# Patient Record
Sex: FEMALE | Race: WHITE
[De-identification: names, ages, dates, MRNs, and addresses within clinical notes are randomized per-mention and may not be internally consistent; named-entity substitution may affect disease eponyms.]

---

## 2020-01-07 ENCOUNTER — HOSPITAL ENCOUNTER (OUTPATIENT)
Dept: HOSPITAL 19 - MC.RAD | Age: 65
End: 2020-01-07
Attending: FAMILY MEDICINE
Payer: COMMERCIAL

## 2020-01-07 DIAGNOSIS — Z00.00: Primary | ICD-10-CM

## 2020-01-07 DIAGNOSIS — Z12.31: ICD-10-CM

## 2020-06-19 ENCOUNTER — HOSPITAL ENCOUNTER (INPATIENT)
Dept: HOSPITAL 19 - COL.ER | Age: 65
LOS: 3 days | Discharge: NURSING, REHAB OR LONG-TERM CARE | DRG: 482 | End: 2020-06-22
Attending: INTERNAL MEDICINE | Admitting: INTERNAL MEDICINE
Payer: COMMERCIAL

## 2020-06-19 VITALS — SYSTOLIC BLOOD PRESSURE: 135 MMHG | HEART RATE: 82 BPM | DIASTOLIC BLOOD PRESSURE: 70 MMHG

## 2020-06-19 VITALS — TEMPERATURE: 97.8 F | DIASTOLIC BLOOD PRESSURE: 72 MMHG | SYSTOLIC BLOOD PRESSURE: 143 MMHG | HEART RATE: 74 BPM

## 2020-06-19 VITALS — SYSTOLIC BLOOD PRESSURE: 153 MMHG | DIASTOLIC BLOOD PRESSURE: 84 MMHG | TEMPERATURE: 98.6 F | HEART RATE: 70 BPM

## 2020-06-19 VITALS — DIASTOLIC BLOOD PRESSURE: 72 MMHG | TEMPERATURE: 97.8 F | HEART RATE: 74 BPM | SYSTOLIC BLOOD PRESSURE: 143 MMHG

## 2020-06-19 VITALS — SYSTOLIC BLOOD PRESSURE: 135 MMHG | DIASTOLIC BLOOD PRESSURE: 75 MMHG | HEART RATE: 78 BPM

## 2020-06-19 VITALS — DIASTOLIC BLOOD PRESSURE: 73 MMHG | SYSTOLIC BLOOD PRESSURE: 128 MMHG | HEART RATE: 67 BPM

## 2020-06-19 VITALS — SYSTOLIC BLOOD PRESSURE: 131 MMHG | HEART RATE: 65 BPM | DIASTOLIC BLOOD PRESSURE: 66 MMHG

## 2020-06-19 VITALS — SYSTOLIC BLOOD PRESSURE: 134 MMHG | DIASTOLIC BLOOD PRESSURE: 64 MMHG | HEART RATE: 63 BPM

## 2020-06-19 VITALS — DIASTOLIC BLOOD PRESSURE: 66 MMHG | SYSTOLIC BLOOD PRESSURE: 130 MMHG | HEART RATE: 68 BPM

## 2020-06-19 VITALS — HEIGHT: 65.98 IN | BODY MASS INDEX: 36 KG/M2 | WEIGHT: 223.99 LBS

## 2020-06-19 VITALS — HEART RATE: 65 BPM | DIASTOLIC BLOOD PRESSURE: 67 MMHG | SYSTOLIC BLOOD PRESSURE: 132 MMHG

## 2020-06-19 VITALS — HEART RATE: 75 BPM | DIASTOLIC BLOOD PRESSURE: 68 MMHG | SYSTOLIC BLOOD PRESSURE: 130 MMHG

## 2020-06-19 DIAGNOSIS — K59.03: ICD-10-CM

## 2020-06-19 DIAGNOSIS — T40.2X5A: ICD-10-CM
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
## 2020-06-19 DIAGNOSIS — M16.12: ICD-10-CM

## 2020-06-19 DIAGNOSIS — Z88.5: ICD-10-CM

## 2020-06-19 DIAGNOSIS — D72.829: ICD-10-CM

## 2020-06-19 DIAGNOSIS — R03.0: ICD-10-CM

## 2020-06-19 DIAGNOSIS — S72.142A: Primary | ICD-10-CM

## 2020-06-19 DIAGNOSIS — K21.9: ICD-10-CM

## 2020-06-19 LAB
ANION GAP SERPL CALC-SCNC: 5 MMOL/L (ref 7–16)
BASOPHILS # BLD: 0 10*3/UL (ref 0–0.2)
BASOPHILS NFR BLD AUTO: 0.2 % (ref 0–2)
BUN SERPL-MCNC: 19 MG/DL (ref 7–17)
CALCIUM SERPL-MCNC: 9.5 MG/DL (ref 8.4–10.2)
CHLORIDE SERPL-SCNC: 107 MMOL/L (ref 98–107)
CO2 SERPL-SCNC: 25 MMOL/L (ref 22–30)
CREAT SERPL-SCNC: 0.71 UMOL/L (ref 0.52–1.25)
EOSINOPHIL # BLD: 0 10*3/UL (ref 0–0.7)
EOSINOPHIL NFR BLD: 0.2 % (ref 0–4)
ERYTHROCYTE [DISTWIDTH] IN BLOOD BY AUTOMATED COUNT: 13.8 % (ref 11.5–14.5)
GLUCOSE SERPL-MCNC: 116 MG/DL (ref 74–106)
GRANULOCYTES # BLD AUTO: 86.2 % (ref 42.2–75.2)
HCT VFR BLD AUTO: 41 % (ref 37–47)
HGB BLD-MCNC: 13.4 G/DL (ref 12.5–16)
INR BLD: 1.1 (ref 0.8–3)
LYMPHOCYTES # BLD: 1.3 10*3/UL (ref 1.2–3.4)
LYMPHOCYTES NFR BLD: 8.2 % (ref 20–51)
MCH RBC QN AUTO: 30 PG (ref 27–31)
MCHC RBC AUTO-ENTMCNC: 33 G/DL (ref 33–37)
MCV RBC AUTO: 91 FL (ref 80–100)
MONOCYTES # BLD: 0.8 10*3/UL (ref 0.1–0.6)
MONOCYTES NFR BLD AUTO: 4.8 % (ref 1.7–9.3)
NEUTROPHILS # BLD: 14.1 10*3/UL (ref 1.4–6.5)
PH UR STRIP.AUTO: 6 [PH] (ref 5–8)
PLATELET # BLD AUTO: 282 K/MM3 (ref 130–400)
PMV BLD AUTO: 10.6 FL (ref 7.4–10.4)
POTASSIUM SERPL-SCNC: 4.1 MMOL/L (ref 3.4–5)
PROTHROMBIN TIME: 12.3 SECONDS (ref 9.7–12.8)
RBC # BLD AUTO: 4.51 M/MM3 (ref 4.1–5.3)
RBC # UR: (no result) /HPF
SODIUM SERPL-SCNC: 138 MMOL/L (ref 137–145)
SP GR UR STRIP.AUTO: 1 (ref 1–1.03)
URN COLLECT METHOD CLASS: (no result)

## 2020-06-19 PROCEDURE — C1713 ANCHOR/SCREW BN/BN,TIS/BN: HCPCS

## 2020-06-19 PROCEDURE — A4314 CATH W/DRAINAGE 2-WAY LATEX: HCPCS

## 2020-06-19 PROCEDURE — A9284 NON-ELECTRONIC SPIROMETER: HCPCS

## 2020-06-19 PROCEDURE — 0QS734Z REPOSITION LEFT UPPER FEMUR WITH INTERNAL FIXATION DEVICE, PERCUTANEOUS APPROACH: ICD-10-PCS | Performed by: ORTHOPAEDIC SURGERY

## 2020-06-19 NOTE — NUR
Patient has done well postop. Vitals stable on room air. Left hip dressing
clean dry & intact. gauze & tegaderm. Ivf per orders. Teds & scds ble. SHe
tolerated dinner. Bedside report to Kayleen Osorio

## 2020-06-19 NOTE — NUR
Patient admitted to room 326 from the ER. Alert & oriented. 5 page, med rec &
inital completed. I have spoke to both Sheridan PATTON & Anel PATTON, orders obtained.
UA completed, EKG completed. Dilaudid for pain amagement. Scds ble & neha to
RLE. Anticiapting surgery this afternoon. SHe has spoken with her family.

## 2020-06-19 NOTE — NUR
Patient to the OR with Olegario higgins. Cna assisted patient with hygiene
prior to OR. Iv dilaudid releived her pain. Patient used bedpan prior to the
OR, did not want pacheco at this time.

## 2020-06-19 NOTE — NUR
Pt was in quite a bit of pain. Pt is able to move her legs now. Pt is
currently resting in bed. Ice was applied to her left hip. Her left leg is
elevated on a pillow. Pt has her call light within reach and her bed is in
lowest position.

## 2020-06-19 NOTE — NUR
Received report from SHIMON Yip. Pt currently lying in bed. Pt did state that
she was having pain. Pt was given 1 Norco tab. When reassessment was done pt
stated that her pain had increased from a 7 up to a 8. Pt was given 1 more tab
at this time. Pt also was able to take her night medications with no problems.
Pt has her call light within reach and her bed is in lowest position.

## 2020-06-19 NOTE — NUR
met with patient to discuss discharge planning. Patient lives
alone in [place] and is employed at [employer] in the [employer]. Patient sees ABDI Love for primary care and obtains
medications from [facility] with no difficulties. Patient does not use any
DME and reports she is normally independent with ADLS. Patient does state she
struggles with hip pain. Patient reports she fell outside this morning and
came to ED via EMS. Patient to have surgery this afternoon and is not sure
what her discharge plan is at this time. Patient states she does have family
that can come stay with her if needed. Patient has three children. Patient's
son Eusebio (ph#[phone]) lives in [place], her son Abran (ph#[phone])
lives in [place], and her daughter Ashanti (ph#[phone]) lives in
[place]. Patient does not have Advance Directives but wanted to complete
DPOA-HC document. SW assisted patient in filling out form. Patient wanted to
designate her sons, Eusebio and Abran. Patient verbalized understanding of what
she is signing. SANTHOSH and ASHLEY Wynne provided witness signature. SW provided
original and copies to patient then placed copy in patient's chart. SW to
continue to monitor for discharge needs.

## 2020-06-20 VITALS — TEMPERATURE: 98.3 F | HEART RATE: 74 BPM | DIASTOLIC BLOOD PRESSURE: 57 MMHG | SYSTOLIC BLOOD PRESSURE: 117 MMHG

## 2020-06-20 VITALS — SYSTOLIC BLOOD PRESSURE: 149 MMHG | HEART RATE: 77 BPM | DIASTOLIC BLOOD PRESSURE: 74 MMHG | TEMPERATURE: 98.3 F

## 2020-06-20 VITALS — SYSTOLIC BLOOD PRESSURE: 132 MMHG | DIASTOLIC BLOOD PRESSURE: 82 MMHG | TEMPERATURE: 97.3 F | HEART RATE: 67 BPM

## 2020-06-20 VITALS — DIASTOLIC BLOOD PRESSURE: 58 MMHG | HEART RATE: 77 BPM | SYSTOLIC BLOOD PRESSURE: 142 MMHG | TEMPERATURE: 97.9 F

## 2020-06-20 VITALS — DIASTOLIC BLOOD PRESSURE: 69 MMHG | TEMPERATURE: 97.6 F | HEART RATE: 70 BPM | SYSTOLIC BLOOD PRESSURE: 134 MMHG

## 2020-06-20 VITALS — DIASTOLIC BLOOD PRESSURE: 69 MMHG | SYSTOLIC BLOOD PRESSURE: 144 MMHG | HEART RATE: 77 BPM | TEMPERATURE: 98.3 F

## 2020-06-20 LAB
HCT VFR BLD AUTO: 36.2 % (ref 37–47)
HGB BLD-MCNC: 11.9 G/DL (ref 12.5–16)

## 2020-06-20 NOTE — NUR
At time of assessment, patient is sitting in chair. She ambulates with walker
and gaitbelt to the bathroom and expresses minimal pain in her hip at this
time. She voids clear, yellow urine. Heart sounds are normal/regular, lungs
are clear, pulses 2/2, no edema present. Left hip dressing is CDI. Incentive
spirometer utilized up to 2500 x2 repitions. Will continue to monitor.

## 2020-06-20 NOTE — NUR
SW update: Sent IPR screen. Screen may be conducted 06/21/2020 and DC on
06/22/2020 if accepted to IPR/ Insurance [employer] unable to obtain auth on
weekend.

## 2020-06-20 NOTE — NUR
Pt currently sleeping in bed. Pt pacheco bad was empited this morning and fresh
water was provided. Pt has her call light within reach and her bed is in
lowest position.

## 2020-06-20 NOTE — NUR
Reported off to SHIMON Underwood. Pt is currently lying in bed. Pt has his call light
within reach and his bed is in lowest position.

## 2020-06-20 NOTE — NUR
Minimal complaints of left hip pain. [place] and scheduled Toradol given.
Ambulatory with walker and standby assist. Physical therapy worked with
patient.

## 2020-06-21 VITALS — TEMPERATURE: 98.4 F | HEART RATE: 70 BPM | SYSTOLIC BLOOD PRESSURE: 140 MMHG | DIASTOLIC BLOOD PRESSURE: 74 MMHG

## 2020-06-21 VITALS — SYSTOLIC BLOOD PRESSURE: 157 MMHG | TEMPERATURE: 97.5 F | HEART RATE: 79 BPM | DIASTOLIC BLOOD PRESSURE: 77 MMHG

## 2020-06-21 VITALS — TEMPERATURE: 97.7 F | SYSTOLIC BLOOD PRESSURE: 132 MMHG | HEART RATE: 72 BPM | DIASTOLIC BLOOD PRESSURE: 70 MMHG

## 2020-06-21 VITALS — HEART RATE: 82 BPM | SYSTOLIC BLOOD PRESSURE: 162 MMHG | DIASTOLIC BLOOD PRESSURE: 70 MMHG

## 2020-06-21 VITALS — HEART RATE: 78 BPM | SYSTOLIC BLOOD PRESSURE: 124 MMHG | DIASTOLIC BLOOD PRESSURE: 66 MMHG | TEMPERATURE: 97.7 F

## 2020-06-21 LAB
ANION GAP SERPL CALC-SCNC: 4 MMOL/L (ref 7–16)
BASOPHILS # BLD: 0 10*3/UL (ref 0–0.2)
BASOPHILS NFR BLD AUTO: 0.3 % (ref 0–2)
BUN SERPL-MCNC: 18 MG/DL (ref 7–17)
CALCIUM SERPL-MCNC: 8.9 MG/DL (ref 8.4–10.2)
CHLORIDE SERPL-SCNC: 105 MMOL/L (ref 98–107)
CO2 SERPL-SCNC: 26 MMOL/L (ref 22–30)
CREAT SERPL-SCNC: 0.73 UMOL/L (ref 0.52–1.25)
EOSINOPHIL # BLD: 0.4 10*3/UL (ref 0–0.7)
EOSINOPHIL NFR BLD: 4.1 % (ref 0–4)
ERYTHROCYTE [DISTWIDTH] IN BLOOD BY AUTOMATED COUNT: 13.8 % (ref 11.5–14.5)
GLUCOSE SERPL-MCNC: 96 MG/DL (ref 74–106)
GRANULOCYTES # BLD AUTO: 65.2 % (ref 42.2–75.2)
HCT VFR BLD AUTO: 35.9 % (ref 37–47)
HCT VFR BLD AUTO: 35.9 % (ref 37–47)
HGB BLD-MCNC: 11.8 G/DL (ref 12.5–16)
HGB BLD-MCNC: 11.8 G/DL (ref 12.5–16)
LYMPHOCYTES # BLD: 1.8 10*3/UL (ref 1.2–3.4)
LYMPHOCYTES NFR BLD: 20.4 % (ref 20–51)
MCH RBC QN AUTO: 30 PG (ref 27–31)
MCHC RBC AUTO-ENTMCNC: 33 G/DL (ref 33–37)
MCV RBC AUTO: 91 FL (ref 80–100)
MONOCYTES # BLD: 0.9 10*3/UL (ref 0.1–0.6)
MONOCYTES NFR BLD AUTO: 9.9 % (ref 1.7–9.3)
NEUTROPHILS # BLD: 5.6 10*3/UL (ref 1.4–6.5)
PLATELET # BLD AUTO: 223 K/MM3 (ref 130–400)
PMV BLD AUTO: 11.4 FL (ref 7.4–10.4)
POTASSIUM SERPL-SCNC: 4.4 MMOL/L (ref 3.4–5)
RBC # BLD AUTO: 3.94 M/MM3 (ref 4.1–5.3)
SODIUM SERPL-SCNC: 135 MMOL/L (ref 137–145)

## 2020-06-21 NOTE — NUR
Patient has had a restful night and pain has been managed with PO pain
medication and Toradol. Patient has ambulated well to and from the bathroom
with walker and gaitbelt. No new concerns at this time.

## 2020-06-21 NOTE — NUR
At time of assessment, patient is alert and oriented, heart sounds
normal/regular, lungs clear, no edema, pain level is 5/10. PO Norco x1
administered for this. Patient ambulates well with walker and gaitbelt. She
has not had a bowel movement today.

## 2020-06-21 NOTE — NUR
Stated slept better last night. Napped at times in recliner chair today.
Ambulates well with walker in room and halls with standby assist. [place] prn
for left hip pain. Ice pack to hip.

## 2020-06-22 ENCOUNTER — HOSPITAL ENCOUNTER (INPATIENT)
Dept: HOSPITAL 19 - IPR | Age: 65
LOS: 3 days | Discharge: HOME | DRG: 561 | End: 2020-06-25
Attending: INTERNAL MEDICINE | Admitting: INTERNAL MEDICINE
Payer: COMMERCIAL

## 2020-06-22 VITALS — DIASTOLIC BLOOD PRESSURE: 71 MMHG | HEART RATE: 84 BPM | TEMPERATURE: 97.9 F | SYSTOLIC BLOOD PRESSURE: 158 MMHG

## 2020-06-22 VITALS — WEIGHT: 224.21 LBS | BODY MASS INDEX: 36.03 KG/M2 | HEIGHT: 65.98 IN

## 2020-06-22 VITALS — TEMPERATURE: 98.3 F | DIASTOLIC BLOOD PRESSURE: 80 MMHG | HEART RATE: 83 BPM | SYSTOLIC BLOOD PRESSURE: 158 MMHG

## 2020-06-22 VITALS — SYSTOLIC BLOOD PRESSURE: 152 MMHG | TEMPERATURE: 97.9 F | HEART RATE: 80 BPM | DIASTOLIC BLOOD PRESSURE: 75 MMHG

## 2020-06-22 VITALS — TEMPERATURE: 97.5 F | DIASTOLIC BLOOD PRESSURE: 71 MMHG | HEART RATE: 76 BPM | SYSTOLIC BLOOD PRESSURE: 160 MMHG

## 2020-06-22 VITALS — TEMPERATURE: 97.7 F | DIASTOLIC BLOOD PRESSURE: 74 MMHG | HEART RATE: 74 BPM | SYSTOLIC BLOOD PRESSURE: 151 MMHG

## 2020-06-22 DIAGNOSIS — Z88.5: ICD-10-CM

## 2020-06-22 DIAGNOSIS — I10: ICD-10-CM

## 2020-06-22 DIAGNOSIS — D72.829: ICD-10-CM

## 2020-06-22 DIAGNOSIS — Z90.710: ICD-10-CM

## 2020-06-22 DIAGNOSIS — K59.00: ICD-10-CM

## 2020-06-22 DIAGNOSIS — Z79.82: ICD-10-CM

## 2020-06-22 DIAGNOSIS — Z79.891: ICD-10-CM

## 2020-06-22 DIAGNOSIS — S72.142D: Primary | ICD-10-CM
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
## 2020-06-22 DIAGNOSIS — Z87.891: ICD-10-CM

## 2020-06-22 NOTE — NUR
The patient discharged to [facility] today, 6/22. There are no
additional needs at this time.

## 2020-06-22 NOTE — NUR
PT UP TO RECLINER FOR BREAKFAST. AM MEDS GIVEN AS ORDERED. PT A/O X3 DENIES
NEEDS. AMBULATES WITH STEADY GAIT WITH THERAPY.

## 2020-06-23 VITALS — TEMPERATURE: 98.1 F | DIASTOLIC BLOOD PRESSURE: 64 MMHG | HEART RATE: 73 BPM | SYSTOLIC BLOOD PRESSURE: 127 MMHG

## 2020-06-23 VITALS — DIASTOLIC BLOOD PRESSURE: 72 MMHG | HEART RATE: 78 BPM | TEMPERATURE: 97.6 F | SYSTOLIC BLOOD PRESSURE: 148 MMHG

## 2020-06-23 NOTE — NUR
Patient was sleeping most the night. Minimal complaints of pain. Did not want
pain medications during the night. She was up several times to the restroom.
Patient gets around with minimal assist. No complaints of nausea. She gets
around with a walker. No other changes at this time. Call light within reach.

## 2020-06-23 NOTE — NUR
PATIENT SITTING UP IN THE BEDSIDE CHAIR. PATIENT IS A&OX4. VSS. BOWEL SOUNDS
ACTIVE ALL FOUR QUADRANTS. PATIENT TOLERATING DIET WITHOUT ANY COMPLAINTS OF
N/V. POSITIVE PEDAL PULSES EQUAL BILATERALLY. CAP REFILL <3 SECONDS. CMS
INTACT. LEFT HIP DRESSED WITH GAUZE AND TEGADERM AND IS CD&I. PATIENT
GIVEN PRN PO TYLENOL AT THIS TIME FOR PAIN. WILL CONTINUE TO MONITOR.

## 2020-06-23 NOTE — NUR
PATIENT GIVEN PRN PO DOSE OF TYLENOL AT THIS TIME FOR PAIN RATED AS A 2/10 ON
A 0-10 SCALE IN THE LEFT HIP. PATIENT DESCRIBES THE PAIN AS AN ACHING PAIN.
PATIENT DENIES ANY OTHER NEEDS AT THIS TIME.

## 2020-06-23 NOTE — NUR
The patient is new to [facility]. SW met with the patient to complete initial intake.
The patient lives alone in [facility]. The patient does not have DME and is
independent with ADLs. The patient's PCP is ABDI Love and patient
receives medications from [facility]. The patient does not have
advanced directives in the EMR but states she has completed them. Her DPOA-HC
designates her sons Eusebio and Abran. SANTHOSH will continue to monitor to ensure a
safe discharge.

## 2020-06-24 VITALS — TEMPERATURE: 97.6 F | DIASTOLIC BLOOD PRESSURE: 81 MMHG | SYSTOLIC BLOOD PRESSURE: 148 MMHG | HEART RATE: 77 BPM

## 2020-06-24 VITALS — DIASTOLIC BLOOD PRESSURE: 73 MMHG | HEART RATE: 78 BPM | SYSTOLIC BLOOD PRESSURE: 141 MMHG | TEMPERATURE: 97.5 F

## 2020-06-24 NOTE — NUR
Patient resting in recliner getting ready to start OT this morning.  Patient
slept great last night.  Left hip incision show no signs of infection such as
redness or drainage.  Dressing is clean dry and intact.  Patient is
independent in her room.  Pain to left his is 2/10 at this time and given prn
tylenol this morning.  Will continue to monitor.

## 2020-06-24 NOTE — NUR
SANTHOSH met with the patient to present IPR Team Conference notes. The patient is
to tentativley discharge on Thursday, 6/24 with outpatient PT and a
wheelchair. The patient states she will probably going to stay with her son in
[place] and she will do her outpatient PT there. She chose [facility]. SANTHOSH faxed HNP, Facesheet, PT notes to [facility]. Will
continue to follow.

## 2020-06-24 NOTE — NUR
Patient has been resting in bed this shift. Patient is able to handle own
toileting needs. Is mod I in the room. No complaints of pain this shift.

## 2020-06-24 NOTE — NUR
Patient independent in her room and is eating independently in her room
following morning therapies.

## 2020-06-24 NOTE — NUR
PATIENT UP IN CHAIR, UP INDEPENDENTLY IN ROOM WITH NO PROBLEMS OR CONCERNS,
DURING CHANGE OF SHIFT REPORT FROM DAY SHIFT NURSEMAGAN

## 2020-06-25 VITALS — TEMPERATURE: 97.6 F | SYSTOLIC BLOOD PRESSURE: 127 MMHG | HEART RATE: 73 BPM | DIASTOLIC BLOOD PRESSURE: 67 MMHG

## 2020-06-25 NOTE — NUR
Patient currently just waiting for her walker to arrive before she calls
family to come pick her up to go home.

## 2020-06-25 NOTE — NUR
Patient Health Summary, Discharge Summary, and Home Meds printed and reviewed
with patient.  Stressed importance of follow up appointments.  Patient will be
getting outpatient PT, but will be selecting one that is closer to her
relatives place and will call and set up those therapies her self. Belongings
gathered by SHIMON/Polly including glasses, clothes, shoes, dirty clothes from
hamper, pink wallet, 4 earrings, 2 rings, one , phone and bath items.
Patient transported via wheelchair by CNA/Gauri and seatbelted for ride
home with sister.  Patient denied questions.

## 2020-06-25 NOTE — NUR
PATIENT SLEEPING DOES NOT AWAKEN WHEN DOOR TO ROOM IS OPENED BY STAFF.
BREATHING NONLABORED AND EVEN. PATIENT UP INDEPENDENTLY IN ROOM WITH NO
PROBLEMS.

## 2020-06-25 NOTE — NUR
[facility]H [facility]. They will deliver the walker this AM.

## 2021-01-12 ENCOUNTER — HOSPITAL ENCOUNTER (OUTPATIENT)
Dept: HOSPITAL 19 - MC.RAD | Age: 66
End: 2021-01-12
Payer: COMMERCIAL

## 2021-01-12 DIAGNOSIS — Z12.31: Primary | ICD-10-CM

## 2022-03-28 ENCOUNTER — HOSPITAL ENCOUNTER (OUTPATIENT)
Dept: HOSPITAL 19 - MC.RAD | Age: 67
End: 2022-03-28
Payer: COMMERCIAL

## 2022-03-28 DIAGNOSIS — Z12.31: Primary | ICD-10-CM

## 2023-11-09 NOTE — NUR
Patient tolerated diet well this morning.  Discussed discharge meds and what
time to set up follow up appointments.  Appointments have been made.  She will
see PCP Anna Esquivel at [facility] on 7/7/20 at 2:30 PM.
Ortho appointment was already made and is on 7/30/20 at 10:45 AM.  Dressing to
right hip was changed this morning.  No signs of redness or drainage to wound
was observed.  The dressing that was removed only had a few spots of dry dark
blood on it.  Area was redressed with gauze and tegaderm with patient
tolerating well.  Patient reported that she thought the Norco 7.5 mg was too
strong for her so she was ordered Norco 5 mg for when she is home.  Patient
currently resting in recliner, call light in reach and is independent in her
room. Rotation Flap Text: The defect edges were debeveled with a #15 scalpel blade.  Given the location of the defect, shape of the defect and the proximity to free margins a rotation flap was deemed most appropriate.  Using a sterile surgical marker, an appropriate rotation flap was drawn incorporating the defect and placing the expected incisions within the relaxed skin tension lines where possible.    The area thus outlined was incised deep to adipose tissue with a #15 scalpel blade.  The skin margins were undermined to an appropriate distance in all directions utilizing iris scissors.